# Patient Record
Sex: FEMALE | Race: WHITE | Employment: FULL TIME | URBAN - METROPOLITAN AREA
[De-identification: names, ages, dates, MRNs, and addresses within clinical notes are randomized per-mention and may not be internally consistent; named-entity substitution may affect disease eponyms.]

---

## 2019-02-15 ENCOUNTER — HOSPITAL ENCOUNTER (EMERGENCY)
Age: 39
Discharge: HOME | End: 2019-02-15
Attending: EMERGENCY MEDICINE
Payer: COMMERCIAL

## 2019-02-15 VITALS
RESPIRATION RATE: 16 BRPM | HEART RATE: 74 BPM | WEIGHT: 133.6 LBS | OXYGEN SATURATION: 99 % | DIASTOLIC BLOOD PRESSURE: 81 MMHG | TEMPERATURE: 98 F | SYSTOLIC BLOOD PRESSURE: 127 MMHG

## 2019-02-15 DIAGNOSIS — E86.0 DEHYDRATION: Primary | ICD-10-CM

## 2019-02-15 DIAGNOSIS — J34.89 FRONTAL SINUS PAIN: ICD-10-CM

## 2019-02-15 DIAGNOSIS — R53.83 FATIGUE, UNSPECIFIED TYPE: ICD-10-CM

## 2019-02-15 DIAGNOSIS — R11.2 NON-INTRACTABLE VOMITING WITH NAUSEA, UNSPECIFIED VOMITING TYPE: ICD-10-CM

## 2019-02-15 DIAGNOSIS — J11.1 INFLUENZA-LIKE ILLNESS: ICD-10-CM

## 2019-02-15 LAB
ALBUMIN SERPL-MCNC: 4.1 G/DL (ref 3.5–5)
ALBUMIN/GLOB SERPL: 1.2 {RATIO} (ref 1.1–2.2)
ALP SERPL-CCNC: 65 U/L (ref 45–117)
ALT SERPL-CCNC: 11 U/L (ref 12–78)
ANION GAP SERPL CALC-SCNC: 10 MMOL/L (ref 5–15)
APPEARANCE UR: CLEAR
AST SERPL-CCNC: 10 U/L (ref 15–37)
BACTERIA URNS QL MICRO: ABNORMAL /HPF
BASOPHILS # BLD: 0 K/UL (ref 0–0.1)
BASOPHILS NFR BLD: 0 % (ref 0–1)
BILIRUB SERPL-MCNC: 0.4 MG/DL (ref 0.2–1)
BILIRUB UR QL: NEGATIVE
BUN SERPL-MCNC: 12 MG/DL (ref 6–20)
BUN/CREAT SERPL: 14 (ref 12–20)
CALCIUM SERPL-MCNC: 9.1 MG/DL (ref 8.5–10.1)
CHLORIDE SERPL-SCNC: 104 MMOL/L (ref 97–108)
CO2 SERPL-SCNC: 25 MMOL/L (ref 21–32)
COLOR UR: ABNORMAL
CREAT SERPL-MCNC: 0.85 MG/DL (ref 0.55–1.02)
DIFFERENTIAL METHOD BLD: ABNORMAL
EOSINOPHIL # BLD: 0 K/UL (ref 0–0.4)
EOSINOPHIL NFR BLD: 0 % (ref 0–7)
EPITH CASTS URNS QL MICRO: ABNORMAL /LPF
ERYTHROCYTE [DISTWIDTH] IN BLOOD BY AUTOMATED COUNT: 13.3 % (ref 11.5–14.5)
GLOBULIN SER CALC-MCNC: 3.4 G/DL (ref 2–4)
GLUCOSE SERPL-MCNC: 90 MG/DL (ref 65–100)
GLUCOSE UR STRIP.AUTO-MCNC: NEGATIVE MG/DL
HCG UR QL: NEGATIVE
HCT VFR BLD AUTO: 36.8 % (ref 35–47)
HGB BLD-MCNC: 11.8 G/DL (ref 11.5–16)
HGB UR QL STRIP: NEGATIVE
KETONES UR QL STRIP.AUTO: >80 MG/DL
LEUKOCYTE ESTERASE UR QL STRIP.AUTO: NEGATIVE
LIPASE SERPL-CCNC: 128 U/L (ref 73–393)
LYMPHOCYTES # BLD: 1.6 K/UL (ref 0.8–3.5)
LYMPHOCYTES NFR BLD: 30 % (ref 12–49)
MCH RBC QN AUTO: 25.1 PG (ref 26–34)
MCHC RBC AUTO-ENTMCNC: 32.1 G/DL (ref 30–36.5)
MCV RBC AUTO: 78.1 FL (ref 80–99)
MONOCYTES # BLD: 0.4 K/UL (ref 0–1)
MONOCYTES NFR BLD: 7 % (ref 5–13)
MUCOUS THREADS URNS QL MICRO: ABNORMAL /LPF
NEUTS SEG # BLD: 3.4 K/UL (ref 1.8–8)
NEUTS SEG NFR BLD: 63 % (ref 32–75)
NITRITE UR QL STRIP.AUTO: NEGATIVE
PH UR STRIP: 5.5 [PH] (ref 5–8)
PLATELET # BLD AUTO: 305 K/UL (ref 150–400)
PMV BLD AUTO: 10.8 FL (ref 8.9–12.9)
POTASSIUM SERPL-SCNC: 3.4 MMOL/L (ref 3.5–5.1)
PROT SERPL-MCNC: 7.5 G/DL (ref 6.4–8.2)
PROT UR STRIP-MCNC: NEGATIVE MG/DL
RBC # BLD AUTO: 4.71 M/UL (ref 3.8–5.2)
RBC #/AREA URNS HPF: ABNORMAL /HPF (ref 0–5)
SODIUM SERPL-SCNC: 139 MMOL/L (ref 136–145)
SP GR UR REFRACTOMETRY: 1.02 (ref 1–1.03)
UR CULT HOLD, URHOLD: NORMAL
UROBILINOGEN UR QL STRIP.AUTO: 0.2 EU/DL (ref 0.2–1)
WBC # BLD AUTO: 5.5 K/UL (ref 3.6–11)
WBC URNS QL MICRO: ABNORMAL /HPF (ref 0–4)
XXWBCSUS: 0

## 2019-02-15 PROCEDURE — 96374 THER/PROPH/DIAG INJ IV PUSH: CPT

## 2019-02-15 PROCEDURE — 96361 HYDRATE IV INFUSION ADD-ON: CPT

## 2019-02-15 PROCEDURE — 83690 ASSAY OF LIPASE: CPT

## 2019-02-15 PROCEDURE — 81025 URINE PREGNANCY TEST: CPT

## 2019-02-15 PROCEDURE — 80053 COMPREHEN METABOLIC PANEL: CPT

## 2019-02-15 PROCEDURE — 81001 URINALYSIS AUTO W/SCOPE: CPT

## 2019-02-15 PROCEDURE — 85025 COMPLETE CBC W/AUTO DIFF WBC: CPT

## 2019-02-15 PROCEDURE — 99283 EMERGENCY DEPT VISIT LOW MDM: CPT

## 2019-02-15 PROCEDURE — 36415 COLL VENOUS BLD VENIPUNCTURE: CPT

## 2019-02-15 PROCEDURE — 74011250636 HC RX REV CODE- 250/636: Performed by: NURSE PRACTITIONER

## 2019-02-15 RX ORDER — MELOXICAM 15 MG/1
15 TABLET ORAL DAILY
COMMUNITY

## 2019-02-15 RX ORDER — ONDANSETRON 2 MG/ML
4 INJECTION INTRAMUSCULAR; INTRAVENOUS
Status: COMPLETED | OUTPATIENT
Start: 2019-02-15 | End: 2019-02-15

## 2019-02-15 RX ORDER — PREGABALIN 150 MG/1
150 CAPSULE ORAL
COMMUNITY

## 2019-02-15 RX ORDER — ONDANSETRON 4 MG/1
4 TABLET, ORALLY DISINTEGRATING ORAL
Qty: 20 TAB | Refills: 0 | Status: SHIPPED | OUTPATIENT
Start: 2019-02-15

## 2019-02-15 RX ORDER — TIZANIDINE HYDROCHLORIDE 2 MG/1
2 CAPSULE, GELATIN COATED ORAL 3 TIMES DAILY
COMMUNITY

## 2019-02-15 RX ORDER — CETIRIZINE HCL 10 MG
10 TABLET ORAL
COMMUNITY

## 2019-02-15 RX ORDER — FLUTICASONE PROPIONATE 50 MCG
2 SPRAY, SUSPENSION (ML) NASAL DAILY
COMMUNITY

## 2019-02-15 RX ADMIN — ONDANSETRON 4 MG: 2 INJECTION INTRAMUSCULAR; INTRAVENOUS at 18:28

## 2019-02-15 RX ADMIN — SODIUM CHLORIDE, SODIUM LACTATE, POTASSIUM CHLORIDE, AND CALCIUM CHLORIDE 1000 ML: 600; 310; 30; 20 INJECTION, SOLUTION INTRAVENOUS at 19:14

## 2019-02-15 RX ADMIN — SODIUM CHLORIDE 1000 ML: 900 INJECTION, SOLUTION INTRAVENOUS at 18:28

## 2019-02-15 NOTE — ED PROVIDER NOTES
Patient is a 70-year-old female with a past medical history significant for chronic pain, anemia and vitamin D deficiency who is ambulatory to the ED today with multiple complaints. Patient states she's had a flulike illness for the last month. She's been seen at an urgent care facility twice. She notes the pain prescribed Ceftin ear and a nasal spray for a sinus infection. Patient's complaints are as follows: Sinus pain, ear fullness, nausea, dizziness when bending over, difficulty driving due to the dizziness, \"bubbly stomach\", loose stools for the last 3 days, chills, body aches, abdominal tenderness. Patient states she had been given Phenergan by mouth but this only worsened her stomach upset. Patient states she takes Lyrica, meloxicam, tizanidine, Flonase, Zyrtec and a monthly allergy injection. The patient denies allergies to medications. Last menstrual cycle: End of January, not sexually active  Social no IV drug abuse or recreational drug usage    PCP: Tenzin Jones MD        The history is provided by the patient. No  was used. No past medical history on file. No past surgical history on file. No family history on file.     Social History     Socioeconomic History    Marital status: SINGLE     Spouse name: Not on file    Number of children: Not on file    Years of education: Not on file    Highest education level: Not on file   Social Needs    Financial resource strain: Not on file    Food insecurity - worry: Not on file    Food insecurity - inability: Not on file    Transportation needs - medical: Not on file   Cubicle needs - non-medical: Not on file   Occupational History    Not on file   Tobacco Use    Smoking status: Not on file   Substance and Sexual Activity    Alcohol use: Not on file    Drug use: Not on file    Sexual activity: Not on file   Other Topics Concern    Not on file   Social History Narrative    Not on file     ALLERGIES: Patient has no known allergies. Review of Systems   Constitutional: Positive for activity change and fatigue. Negative for appetite change, chills and fever. Generalized body aches   HENT: Positive for sinus pressure and sinus pain. Negative for sore throat, trouble swallowing and voice change. Respiratory: Negative for cough, shortness of breath and wheezing. Cardiovascular: Negative for chest pain. Gastrointestinal: Positive for abdominal pain, diarrhea (loose stools) and nausea. Negative for constipation and vomiting. Genitourinary: Negative for difficulty urinating, dysuria, flank pain and urgency. Musculoskeletal: Negative for back pain. Skin: Negative for color change and rash. Neurological: Positive for dizziness. Negative for speech difficulty, weakness and headaches. Psychiatric/Behavioral: Negative. All other systems reviewed and are negative. Vitals:    02/15/19 1809   BP: 127/81   Pulse: 74   Resp: 16   Temp: 98 °F (36.7 °C)   SpO2: 99%   Weight: 60.6 kg (133 lb 9.6 oz)          Physical Exam   Constitutional: She is oriented to person, place, and time. Vital signs are normal. She appears well-developed and well-nourished. She is active and cooperative. 43year old female in NAD   HENT:   Head: Normocephalic and atraumatic. Neck: Normal range of motion. Neck supple. Cardiovascular: Normal rate, regular rhythm, normal heart sounds and intact distal pulses. Pulmonary/Chest: Effort normal and breath sounds normal. No stridor. No respiratory distress. She has no decreased breath sounds. She has no wheezes. She has no rhonchi. She has no rales. She exhibits no tenderness. Abdominal: Soft. Normal appearance. Bowel sounds are increased. There is generalized tenderness. There is CVA tenderness (mild bilateral). There is no rigidity, no rebound and no guarding. No hernia. Musculoskeletal: Normal range of motion.    Neurological: She is alert and oriented to person, place, and time. Skin: Skin is warm and dry. No erythema. Psychiatric: She has a normal mood and affect. Her behavior is normal. Judgment and thought content normal.   Nursing note and vitals reviewed. [facility]     Procedures    Assessment & Plan:     Orders Placed This Encounter    URINE CULTURE HOLD SAMPLE    URINALYSIS W/MICROSCOPIC    CBC WITH AUTOMATED DIFF    METABOLIC PANEL, COMPREHENSIVE    LIPASE    POC URINE PREGNANCY TEST    sodium chloride 0.9 % bolus infusion 1,000 mL    fluticasone (FLONASE ALLERGY RELIEF) 50 mcg/actuation nasal spray    cetirizine (ZYRTEC) 10 mg tablet    syringe with needle, 1 mL (ALLERGY SYRINGE)    pregabalin (LYRICA) 150 mg capsule    tiZANidine (ZANAFLEX) 2 mg capsule    meloxicam (MOBIC) 15 mg tablet    ondansetron (ZOFRAN) injection 4 mg       Discussed with Blanca Duff MD,ED Provider    Alethea Boyce NP  02/15/19  6:18 PM    Still has some dizziness. Significant ketones on UA with mucous, mild hypokalemia. Will give LR bolus. UA with Mod epithelial cells will otherwise consider a poor catch and not treat for UTI. Alethea Boyce NP  02/15/19  7:13 PM    Labs as indicated above. Follow-up with PCP for further work-up. Discussed return precautions. 8:14 PM  The patient has been reevaluated. The patient is ready for discharge. The patient's signs, symptoms, diagnosis, and discharge instructions have been discussed and the patient/ family has conveyed their understanding. The patient is to follow up as recommended or return to the ED should their symptoms worsen. Plan has been discussed and the patient is in agreement.     LABORATORY TESTS:  Labs Reviewed   URINALYSIS W/MICROSCOPIC - Abnormal; Notable for the following components:       Result Value    Ketone >80 (*)     Epithelial cells MODERATE (*)     Bacteria 1+ (*)     Mucus 3+ (*)     All other components within normal limits   CBC WITH AUTOMATED DIFF - Abnormal; Notable for the following components:    MCV 78.1 (*)     MCH 25.1 (*)     All other components within normal limits   METABOLIC PANEL, COMPREHENSIVE - Abnormal; Notable for the following components:    Potassium 3.4 (*)     ALT (SGPT) 11 (*)     AST (SGOT) 10 (*)     All other components within normal limits   URINE CULTURE HOLD SAMPLE   LIPASE   HCG URINE, QL. - POC       IMAGING RESULTS:  No results found. MEDICATIONS GIVEN:  Medications   sodium chloride 0.9 % bolus infusion 1,000 mL (0 mL IntraVENous IV Completed 2/15/19 1928)   ondansetron (ZOFRAN) injection 4 mg (4 mg IntraVENous Given 2/15/19 1828)   lactated ringers bolus infusion 1,000 mL (0 mL IntraVENous IV Completed 2/15/19 2014)       IMPRESSION:  1. Dehydration    2. Fatigue, unspecified type    3. Influenza-like illness    4. Frontal sinus pain    5. Non-intractable vomiting with nausea, unspecified vomiting type        PLAN:  1. Discharge Medication List as of 2/15/2019  7:33 PM      START taking these medications    Details   ondansetron (ZOFRAN ODT) 4 mg disintegrating tablet Take 1 Tab by mouth every eight (8) hours as needed for Nausea. , Print, Disp-20 Tab, R-0         CONTINUE these medications which have NOT CHANGED    Details   fluticasone (FLONASE ALLERGY RELIEF) 50 mcg/actuation nasal spray 2 Sprays by Both Nostrils route daily. , Historical Med      cetirizine (ZYRTEC) 10 mg tablet Take 10 mg by mouth., Historical Med      syringe with needle, 1 mL (ALLERGY SYRINGE) by Does Not Apply route., Historical Med      pregabalin (LYRICA) 150 mg capsule Take 150 mg by mouth., Historical Med      tiZANidine (ZANAFLEX) 2 mg capsule Take 2 mg by mouth three (3) times daily. , Historical Med      meloxicam (MOBIC) 15 mg tablet Take 15 mg by mouth daily. , Historical Med           2.    Follow-up Information     Follow up With Specialties Details Why Contact Info    Chiqui Moreland MD Family Practice Schedule an appointment as soon as possible for a visit For continued work-up of yoru symptoms [address]      [facility] EMERGENCY DEPT Emergency Medicine  As needed, If symptoms worsen [phone] [facility] [id number] [phone]        3.      Return to ED for new or worsening symptoms       Danae Ruiz NP

## 2019-02-15 NOTE — ED TRIAGE NOTES
Pt arrives complaining of flu like symptoms for about a month. She reports nausea but no vomiting or diarrhea. She feels dizzy when she bends over. She went to urgent care on Feb 1 and was prescribed Cefdinir and nasal spray. She feels tired all the time.

## 2019-02-16 NOTE — ED NOTES
Pt resting on stretcher in no obvious distress. Reports no change in nausea or dizziness after medication administration. YVONNE Medrano notified. Call bunch in reach.

## 2019-02-16 NOTE — ED NOTES
The patient was discharged home by Banner, NP  in stable condition. The patient is alert and oriented, in no respiratory distress and discharge vital signs obtained. The patient's diagnosis, condition and treatment were explained. The patient expressed understanding. One prescription given. No work/school note given. A discharge plan has been developed. A  was not involved in the process. Aftercare instructions were given. Pt ambulatory out of the ED.

## 2019-02-16 NOTE — DISCHARGE INSTRUCTIONS
Thank you for allowing us to care for you today. Please follow-up with your Primary Care provider in the next 2-3 days if your symptoms do not improve. Plan for home:     Zofran every 6-8 hours as needed for nausea. Probiotics from over-the-counter sources/yogurt for loose stools. Nausea/vomiting: For the next few days watch what you eat. Eat a bland diet. Fluids are the most important. Gatorade, powerade, water are the best fluids to drink. BRAT Diet: Bananas, Rice, Applesauce, Tea/Toast.  Add foods back slowly and as you tolerate. The last type of foods to add back will be salads, acetic foods like citrus, tomatoes and spicy foods. Come back to the ER if you have worsening symptoms, fevers over 100.9, shaking chills, nausea or vomiting. Continue work-up of symptoms with PCP. Patient Education        Oral Rehydration: Care Instructions  Your Care Instructions    Dehydration occurs when your body loses too much water. This can happen if you do not drink enough fluids or lose a lot of fluid due to diarrhea, vomiting, or sweating. Being dehydrated can cause health problems and can even be life-threatening. To replace lost fluids, you need to drink liquid that contains special chemicals called electrolytes. Electrolytes keep your body working well. Plain water does not have electrolytes. You also need to rest to prevent more fluid loss. Replacing water and electrolytes (oral rehydration) completely takes about 36 hours. But you should feel better within a few hours. Follow-up care is a key part of your treatment and safety. Be sure to make and go to all appointments, and call your doctor if you are having problems. It's also a good idea to know your test results and keep a list of the medicines you take. How can you care for yourself at home? · Take frequent sips of a drink such as Gatorade, Powerade, or other rehydration drinks that your doctor suggests.  These replace both fluid and important chemicals (electrolytes) you need for balance in your blood. · Drink 2 quarts of cool liquid over 2 to 4 hours. You should have at least 10 glasses of liquid a day to replace lost fluid. If you have kidney, heart, or liver disease and have to limit fluids, talk with your doctor before you increase the amount of fluids you drink. · Make your own drink. Measure everything carefully. The drink may not work well or may even be harmful if the amounts are off. ? 1 quart water  ? ½ teaspoon salt  ? 6 teaspoons sugar  · Do not drink liquid with caffeine, such as coffee and sarah. · Do not drink any alcohol. It can make you dehydrated. · Drink plenty of fluids, enough so that your urine is light yellow or clear like water. If you have kidney, heart, or liver disease and have to limit fluids, talk with your doctor before you increase the amount of fluids you drink. When should you call for help? Call 911 anytime you think you may need emergency care. For example, call if:    · You have signs of severe dehydration, such as:  ? You are confused or unable to stay awake.  ? You passed out (lost consciousness).    Call your doctor now or seek immediate medical care if:    · You still have signs of dehydration. You have sunken eyes and a dry mouth, and you pass only a little dark urine.     · You are dizzy or lightheaded, or you feel like you may faint.     · You are not able to keep down fluids.    Watch closely for changes in your health, and be sure to contact your doctor if:    · You do not get better as expected. Where can you learn more? Go to http://joey-howie.info/. Enter I040 in the search box to learn more about \"Oral Rehydration: Care Instructions. \"  Current as of: September 23, 2018  Content Version: 11.9  © 6790-1209 [employer]. Care instructions adapted under license by [employer] (which disclaims liability or warranty for this information).  If you have questions about a medical condition or this instruction, always ask your healthcare professional. [place] [address] any warranty or liability for your use of this information.

## 2019-02-16 NOTE — ED NOTES
Pt resting on stretcher watching TV. Medication administration explained to pt. Pt verbalized good understanding. Pt has no needs at this time.  Call bell in reach

## 2024-11-15 ENCOUNTER — TELEPHONE (OUTPATIENT)
Age: 44
End: 2024-11-15

## 2024-11-15 NOTE — TELEPHONE ENCOUNTER
Reached out to PT due to them being on cancellation list but was sent to VM. LVM requesting call back, also informed that we will reach out once we have another cancellation.

## 2025-02-11 NOTE — PATIENT INSTRUCTIONS
Thank you for visiting [facility]!      For future medication refills, we require updated lab results and an upcoming appointment to be in our system to refill prescriptions.  Without these two things, your refill will be DENIED.  If you miss your upcoming appointment, your refill will also be DENIED.      We appreciate your understanding of this critical clinical aspect of our practice. -LIDIA Evans

## 2025-02-12 ENCOUNTER — HOSPITAL ENCOUNTER (OUTPATIENT)
Facility: HOSPITAL | Age: 45
Discharge: HOME | End: 2025-02-15
Payer: COMMERCIAL

## 2025-02-12 ENCOUNTER — OFFICE VISIT (OUTPATIENT)
Age: 45
End: 2025-02-12
Payer: COMMERCIAL

## 2025-02-12 VITALS
TEMPERATURE: 98.2 F | WEIGHT: 139 LBS | OXYGEN SATURATION: 98 % | RESPIRATION RATE: 16 BRPM | HEART RATE: 70 BPM | SYSTOLIC BLOOD PRESSURE: 113 MMHG | DIASTOLIC BLOOD PRESSURE: 74 MMHG

## 2025-02-12 DIAGNOSIS — R76.8 POSITIVE ANA (ANTINUCLEAR ANTIBODY): Primary | ICD-10-CM

## 2025-02-12 DIAGNOSIS — M79.641 BILATERAL HAND PAIN: ICD-10-CM

## 2025-02-12 DIAGNOSIS — M79.642 BILATERAL HAND PAIN: ICD-10-CM

## 2025-02-12 LAB
-: NORMAL
25(OH)D3 SERPL-MCNC: 24.6 NG/ML (ref 30–100)
ALBUMIN SERPL-MCNC: 4.1 G/DL (ref 3.5–5)
ALBUMIN/GLOB SERPL: 1.1 (ref 1.1–2.2)
ALP SERPL-CCNC: 66 U/L (ref 45–117)
ALT SERPL-CCNC: 12 U/L (ref 12–78)
ANION GAP SERPL CALC-SCNC: 7 MMOL/L (ref 2–12)
AST SERPL-CCNC: 9 U/L (ref 15–37)
BASOPHILS # BLD: 0.03 K/UL (ref 0–0.1)
BASOPHILS NFR BLD: 0.6 % (ref 0–1)
BILIRUB SERPL-MCNC: 0.4 MG/DL (ref 0.2–1)
BUN SERPL-MCNC: 12 MG/DL (ref 6–20)
BUN/CREAT SERPL: 16 (ref 12–20)
CALCIUM SERPL-MCNC: 10.1 MG/DL (ref 8.5–10.1)
CHLORIDE SERPL-SCNC: 104 MMOL/L (ref 97–108)
CK SERPL-CCNC: 77 U/L (ref 26–192)
CO2 SERPL-SCNC: 27 MMOL/L (ref 21–32)
CREAT SERPL-MCNC: 0.74 MG/DL (ref 0.55–1.02)
CRP SERPL-MCNC: <0.29 MG/DL (ref 0–0.3)
DIFFERENTIAL METHOD BLD: ABNORMAL
EOSINOPHIL # BLD: 0.04 K/UL (ref 0–0.4)
EOSINOPHIL NFR BLD: 0.8 % (ref 0–7)
ERYTHROCYTE [DISTWIDTH] IN BLOOD BY AUTOMATED COUNT: 13 % (ref 11.5–14.5)
ERYTHROCYTE [SEDIMENTATION RATE] IN BLOOD: 9 MM/HR (ref 0–20)
GLOBULIN SER CALC-MCNC: 3.6 G/DL (ref 2–4)
GLUCOSE SERPL-MCNC: 91 MG/DL (ref 65–100)
HAV IGM SER QL: NONREACTIVE
HBV CORE IGM SER QL: NONREACTIVE
HBV SURFACE AG SER QL: <0.1 INDEX
HBV SURFACE AG SER QL: NEGATIVE
HCT VFR BLD AUTO: 38.8 % (ref 35–47)
HCV AB SER IA-ACNC: 0.11 INDEX
HCV AB SERPL QL IA: NONREACTIVE
HGB BLD-MCNC: 11.9 G/DL (ref 11.5–16)
IMM GRANULOCYTES # BLD AUTO: 0.01 K/UL (ref 0–0.04)
IMM GRANULOCYTES NFR BLD AUTO: 0.2 % (ref 0–0.5)
LDH SERPL L TO P-CCNC: 138 U/L (ref 81–246)
LYMPHOCYTES # BLD: 1.81 K/UL (ref 0.8–3.5)
LYMPHOCYTES NFR BLD: 38.1 % (ref 12–49)
MCH RBC QN AUTO: 24.1 PG (ref 26–34)
MCHC RBC AUTO-ENTMCNC: 30.7 G/DL (ref 30–36.5)
MCV RBC AUTO: 78.5 FL (ref 80–99)
MONOCYTES # BLD: 0.26 K/UL (ref 0–1)
MONOCYTES NFR BLD: 5.5 % (ref 5–13)
NEUTS SEG # BLD: 2.6 K/UL (ref 1.8–8)
NEUTS SEG NFR BLD: 54.8 % (ref 32–75)
NRBC # BLD: 0 K/UL (ref 0–0.01)
NRBC BLD-RTO: 0 PER 100 WBC
PLATELET # BLD AUTO: 387 K/UL (ref 150–400)
PMV BLD AUTO: 9.9 FL (ref 8.9–12.9)
POTASSIUM SERPL-SCNC: 3.9 MMOL/L (ref 3.5–5.1)
PROT SERPL-MCNC: 7.7 G/DL (ref 6.4–8.2)
RBC # BLD AUTO: 4.94 M/UL (ref 3.8–5.2)
SODIUM SERPL-SCNC: 138 MMOL/L (ref 136–145)
URATE SERPL-MCNC: 3.2 MG/DL (ref 2.6–6)
WBC # BLD AUTO: 4.8 K/UL (ref 3.6–11)

## 2025-02-12 PROCEDURE — 99204 OFFICE O/P NEW MOD 45 MIN: CPT | Performed by: NURSE PRACTITIONER

## 2025-02-12 PROCEDURE — 73130 X-RAY EXAM OF HAND: CPT

## 2025-02-12 RX ORDER — MONTELUKAST SODIUM 10 MG/1
10 TABLET ORAL NIGHTLY
COMMUNITY

## 2025-02-12 RX ORDER — GABAPENTIN 300 MG/1
300 CAPSULE ORAL 2 TIMES DAILY
COMMUNITY
Start: 2024-12-13

## 2025-02-12 RX ORDER — FLUTICASONE PROPIONATE 50 MCG
SPRAY, SUSPENSION (ML) NASAL
COMMUNITY

## 2025-02-12 RX ORDER — ONDANSETRON 4 MG/1
TABLET, ORALLY DISINTEGRATING ORAL
COMMUNITY
Start: 2024-06-11

## 2025-02-12 RX ORDER — TRAMADOL HYDROCHLORIDE 50 MG/1
TABLET ORAL
COMMUNITY

## 2025-02-12 RX ORDER — PHENTERMINE HYDROCHLORIDE 15 MG/1
15 CAPSULE ORAL DAILY
COMMUNITY

## 2025-02-12 RX ORDER — SUMATRIPTAN SUCCINATE 100 MG/1
100 TABLET ORAL
COMMUNITY

## 2025-02-12 RX ORDER — TRAZODONE HYDROCHLORIDE 150 MG/1
150 TABLET ORAL
COMMUNITY

## 2025-02-12 RX ORDER — OLOPATADINE HYDROCHLORIDE 665 UG/1
SPRAY NASAL
COMMUNITY

## 2025-02-12 RX ORDER — HYDROQUINONE 40 MG/G
CREAM TOPICAL
COMMUNITY

## 2025-02-12 RX ORDER — TIZANIDINE HYDROCHLORIDE 2 MG/1
2 CAPSULE, GELATIN COATED ORAL 3 TIMES DAILY
COMMUNITY

## 2025-02-12 RX ORDER — TAZAROTENE 0.5 MG/G
CREAM TOPICAL
COMMUNITY

## 2025-02-12 ASSESSMENT — ENCOUNTER SYMPTOMS
CONSTIPATION: 0
EYE PAIN: 0
EYE REDNESS: 0
COUGH: 0
VOMITING: 0
NAUSEA: 1
BLOOD IN STOOL: 0
SHORTNESS OF BREATH: 1
SORE THROAT: 0
DIARRHEA: 0
TROUBLE SWALLOWING: 0
COLOR CHANGE: 0
ABDOMINAL PAIN: 1

## 2025-02-12 ASSESSMENT — PATIENT HEALTH QUESTIONNAIRE - PHQ9
SUM OF ALL RESPONSES TO PHQ QUESTIONS 1-9: 0
SUM OF ALL RESPONSES TO PHQ QUESTIONS 1-9: 0
2. FEELING DOWN, DEPRESSED OR HOPELESS: NOT AT ALL
SUM OF ALL RESPONSES TO PHQ QUESTIONS 1-9: 0
SUM OF ALL RESPONSES TO PHQ QUESTIONS 1-9: 0
1. LITTLE INTEREST OR PLEASURE IN DOING THINGS: NOT AT ALL
SUM OF ALL RESPONSES TO PHQ9 QUESTIONS 1 & 2: 0

## 2025-02-12 NOTE — PROGRESS NOTES
Chief Complaint   Patient presents with    New Patient     1. Have you been to the ER, urgent care clinic since your last visit?  Hospitalized since your last visit?No    2. Have you seen or consulted any other health care providers outside of the [facility] since your last visit?  Include any pap smears or colon screening.  yes
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
## 2025-02-14 LAB
ALDOLASE SERPL-CCNC: 2.4 U/L (ref 3.3–10.3)
C3 SERPL-MCNC: 128 MG/DL (ref 82–167)
C4 SERPL-MCNC: 25 MG/DL (ref 12–38)

## 2025-02-15 LAB
G6PD BLD QN: 239 U/10E12 RBC (ref 127–427)
RBC # BLD AUTO: 4.88 X10E6/UL (ref 3.77–5.28)

## 2025-02-17 LAB — CCP IGA+IGG SERPL IA-ACNC: 5 UNITS (ref 0–19)

## 2025-02-20 LAB — DSDNA AB SER FARR-ACNC: <8 IU/ML

## 2025-03-27 ASSESSMENT — ENCOUNTER SYMPTOMS
DIARRHEA: 0
CONSTIPATION: 0
VOMITING: 0
NAUSEA: 1
BLOOD IN STOOL: 0
COUGH: 0

## 2025-03-27 NOTE — PROGRESS NOTES
Obdulia Rick (:  1980) is a 44 y.o. female    6/10/2024 MERLE 1:80 Cytoplasmic, SSA <1, SSB <1, Kiser <1, RNP <1, Chromatin <1, DsDNA 6, Anyi 1 <1, Scl 70 <1, Centromere <1, Ribosomal P <1, RF <10    Subjective   HPI  The patient is a 44 y.o.female here for a follow up visit for a positive MERLE. We reviewed her lab and imaging results. She reports ankle swelling and she says she's trying to wear her compression socks more frequently. She sees pain management for fibromyalgia, chronic low back pain, ulnar neuropathy and cubital tunnel syndrome. She denies recent infections or antibiotics.     Review of Systems   Constitutional:  Negative for chills and fever.   HENT:          Denies nasals sores  Denies dry mouth  Reports post nasal drip.    Respiratory:  Negative for cough and shortness of breath.    Cardiovascular:  Negative for chest pain.   Gastrointestinal:  Positive for nausea (migraines). Negative for abdominal pain, blood in stool, constipation, diarrhea and vomiting.        Denies dark, tarry stools   Genitourinary:  Negative for dysuria and hematuria.   Musculoskeletal:  Negative for arthralgias and joint swelling.   Skin:  Positive for rash (occasional from allergies).     Current Outpatient Medications   Medication Sig Dispense Refill    hydroquinone 4 % cream APPLY SMALL AMOUNT TO AFFECTED AREAS ON FACE TWICE DAILY AS NEEDED TO LIGHTEN. WEAR SUNSCREEN DAILY      fluticasone (FLONASE) 50 MCG/ACT nasal spray SPRAY 1 SPRAY INTRANASALLY EVERY DAY AS NEEDED      gabapentin (NEURONTIN) 300 MG capsule Take 1 capsule by mouth 2 times daily. Max Daily Amount: 600 mg      montelukast (SINGULAIR) 10 MG tablet Take 1 tablet by mouth nightly      olopatadine (PATANASE) 0.6 % SOLN nassl soln SPRAY 2 SPRAYS INTO BOTH NOSTRILS TWICE A DAY AS NEEDED      ondansetron (ZOFRAN-ODT) 4 MG disintegrating tablet 1 TAB ORAL EVERY 6 HOURS AS NEEDED FOR NAUSEA      phentermine 15 MG capsule Take 1 capsule by mouth

## 2025-03-28 ENCOUNTER — OFFICE VISIT (OUTPATIENT)
Age: 45
End: 2025-03-28
Payer: COMMERCIAL

## 2025-03-28 VITALS
WEIGHT: 142 LBS | HEART RATE: 71 BPM | RESPIRATION RATE: 16 BRPM | TEMPERATURE: 98.5 F | DIASTOLIC BLOOD PRESSURE: 83 MMHG | OXYGEN SATURATION: 98 % | SYSTOLIC BLOOD PRESSURE: 122 MMHG

## 2025-03-28 DIAGNOSIS — R76.8 POSITIVE ANA (ANTINUCLEAR ANTIBODY): Primary | ICD-10-CM

## 2025-03-28 DIAGNOSIS — M75.81 RIGHT ROTATOR CUFF TENDINITIS: ICD-10-CM

## 2025-03-28 DIAGNOSIS — E55.9 HYPOVITAMINOSIS D: ICD-10-CM

## 2025-03-28 PROCEDURE — 99213 OFFICE O/P EST LOW 20 MIN: CPT | Performed by: NURSE PRACTITIONER

## 2025-03-28 RX ORDER — ERENUMAB-AOOE 140 MG/ML
INJECTION, SOLUTION SUBCUTANEOUS
COMMUNITY
Start: 2024-10-11

## 2025-03-28 ASSESSMENT — PATIENT HEALTH QUESTIONNAIRE - PHQ9
2. FEELING DOWN, DEPRESSED OR HOPELESS: NOT AT ALL
SUM OF ALL RESPONSES TO PHQ QUESTIONS 1-9: 0
1. LITTLE INTEREST OR PLEASURE IN DOING THINGS: NOT AT ALL

## 2025-03-28 ASSESSMENT — ENCOUNTER SYMPTOMS
ABDOMINAL PAIN: 0
SHORTNESS OF BREATH: 0

## 2025-03-28 NOTE — PROGRESS NOTES
Chief Complaint   Patient presents with    Joint Pain     1. Have you been to the ER, urgent care clinic since your last visit?  Hospitalized since your last visit?No    2. Have you seen or consulted any other health care providers outside of the [facility] since your last visit?  Include any pap smears or colon screening. No

## 2025-03-28 NOTE — PATIENT INSTRUCTIONS
Thank you for visiting [facility]!      For future medication refills, we require updated lab results and an upcoming appointment to be in our system to refill prescriptions.  Without these two things, your refill will be DENIED.  If you miss your upcoming appointment, your refill will also be DENIED.      We appreciate your understanding of this critical clinical aspect of our practice. - LIDIA Evans    Start over the counter Vitamin D 2000u daily.     Return to the clinic in 6 months.